# Patient Record
Sex: FEMALE | Race: BLACK OR AFRICAN AMERICAN | Employment: UNEMPLOYED | URBAN - METROPOLITAN AREA
[De-identification: names, ages, dates, MRNs, and addresses within clinical notes are randomized per-mention and may not be internally consistent; named-entity substitution may affect disease eponyms.]

---

## 2017-12-14 ENCOUNTER — HOSPITAL ENCOUNTER (OUTPATIENT)
Dept: MAMMOGRAPHY | Age: 57
Discharge: HOME | End: 2017-12-14
Attending: FAMILY MEDICINE
Payer: MEDICAID

## 2017-12-14 DIAGNOSIS — Z12.31 VISIT FOR SCREENING MAMMOGRAM: ICD-10-CM

## 2017-12-14 PROCEDURE — 77067 SCR MAMMO BI INCL CAD: CPT

## 2019-01-24 ENCOUNTER — HOSPITAL ENCOUNTER (OUTPATIENT)
Dept: MAMMOGRAPHY | Age: 59
Discharge: HOME | End: 2019-01-24
Attending: FAMILY MEDICINE
Payer: MEDICAID

## 2019-01-24 DIAGNOSIS — Z12.31 ENCOUNTER FOR SCREENING MAMMOGRAM FOR MALIGNANT NEOPLASM OF BREAST: ICD-10-CM

## 2019-01-24 PROCEDURE — 77067 SCR MAMMO BI INCL CAD: CPT

## 2020-01-28 ENCOUNTER — HOSPITAL ENCOUNTER (OUTPATIENT)
Dept: MAMMOGRAPHY | Age: 60
Discharge: HOME | End: 2020-01-28
Attending: FAMILY MEDICINE
Payer: MEDICAID

## 2020-01-28 DIAGNOSIS — Z12.31 VISIT FOR SCREENING MAMMOGRAM: ICD-10-CM

## 2020-01-28 PROCEDURE — 77067 SCR MAMMO BI INCL CAD: CPT

## 2020-12-02 ENCOUNTER — VIRTUAL VISIT (OUTPATIENT)
Dept: ENDOCRINOLOGY | Age: 60
End: 2020-12-02
Payer: MEDICAID

## 2020-12-02 DIAGNOSIS — E11.69 TYPE 2 DIABETES MELLITUS WITH OTHER SPECIFIED COMPLICATION, WITH LONG-TERM CURRENT USE OF INSULIN (HCC): Primary | ICD-10-CM

## 2020-12-02 DIAGNOSIS — E78.2 MIXED HYPERLIPIDEMIA: ICD-10-CM

## 2020-12-02 DIAGNOSIS — I10 ESSENTIAL HYPERTENSION: ICD-10-CM

## 2020-12-02 DIAGNOSIS — Z79.4 TYPE 2 DIABETES MELLITUS WITH OTHER SPECIFIED COMPLICATION, WITH LONG-TERM CURRENT USE OF INSULIN (HCC): Primary | ICD-10-CM

## 2020-12-02 PROCEDURE — 99205 OFFICE O/P NEW HI 60 MIN: CPT | Performed by: INTERNAL MEDICINE

## 2020-12-02 RX ORDER — INSULIN GLARGINE 100 [IU]/ML
INJECTION, SOLUTION SUBCUTANEOUS
COMMUNITY
Start: 2020-10-22 | End: 2021-01-21 | Stop reason: SDUPTHER

## 2020-12-02 RX ORDER — EXENATIDE 2 MG/.65ML
INJECTION, SUSPENSION, EXTENDED RELEASE SUBCUTANEOUS
COMMUNITY
Start: 2020-11-09 | End: 2021-04-30 | Stop reason: SDUPTHER

## 2020-12-02 RX ORDER — MONTELUKAST SODIUM 10 MG/1
TABLET ORAL
COMMUNITY
Start: 2020-09-22

## 2020-12-02 RX ORDER — TIOTROPIUM BROMIDE 18 UG/1
CAPSULE ORAL; RESPIRATORY (INHALATION)
COMMUNITY
Start: 2020-10-05

## 2020-12-02 RX ORDER — FLUTICASONE PROPIONATE 50 MCG
SPRAY, SUSPENSION (ML) NASAL
COMMUNITY
Start: 2020-09-22

## 2020-12-02 RX ORDER — ACETAMINOPHEN 500 MG
TABLET ORAL
COMMUNITY
Start: 2020-10-22

## 2020-12-02 RX ORDER — ATORVASTATIN CALCIUM 40 MG/1
TABLET, FILM COATED ORAL
COMMUNITY
Start: 2020-11-09

## 2020-12-02 RX ORDER — LOSARTAN POTASSIUM 50 MG/1
TABLET ORAL
COMMUNITY
Start: 2020-10-22

## 2020-12-02 RX ORDER — ASPIRIN 81 MG/1
TABLET ORAL
COMMUNITY
Start: 2020-09-30

## 2020-12-02 NOTE — PROGRESS NOTES
Chief Complaint   Patient presents with    Diabetes     pcp and pharmacy verified. DOXY. ME            **THIS IS A VIRTUAL VISIT VIA A VIDEO ENCOUNTER. PATIENT AGREED TO HAVE THEIR CARE DELIVERED OVER VIDEO IN PLACE OF THEIR REGULARLY SCHEDULED OFFICE VISIT**      History of Present Illness: Nieves Snellen is a 63 y.o. female who I was asked to see in consult by Dr. Conchita Landa for evaluation of diabetes. Will request records from Dr. Carlos Justice. She was first diagnosed with diabetes \"a long time ago I really don't know\". Current regimen is Metformin 500mg BID, Lantus 54 units daily and Bydureon 2mg weekly. She does not recall any other DM medications in the past.  She checks her blood sugar every morning, her FBGs run in the 130-150s range. She denies issues of hypoglycemia. A typical day is as follows:  Pt wakes around 7AM.  - She has breakfast around 8AM, this AM she had corned beef hash, bread and regular ginger ale. - She denies having a mid-morning snack.  - She has lunch around 1230PM, yesterday she had fried chicken wings, fries and water. - She denies having a mid-afternoon snack  - She has dinner around 6PM, last night she had vegetable soup and water. Exercise consists of house work. No history of vascular disease. She does have COPD. No history of neuropathy, or nephropathy. Last eye exam was \"awhile ago I have an appointment with Dr. Collette Highland in February 2021\". Pt reports she has never seen a diabetic educator and she notes she would be interested in these classes.     Past Medical History:   Diagnosis Date    COPD (chronic obstructive pulmonary disease) ([facility] [address]     Diabetes ([facility] [address]     Hypertension     Psychiatric disorder     anxiety     Past Surgical History:   Procedure Laterality Date    HX TUBAL LIGATION      [place] COLSC FLX W/REMOVAL LESION BY HOT BX FORCEPS  1/20/2014          Current Outpatient Medications   Medication Sig    aspirin delayed-release 81 mg tablet TAKE 1 TABLET BY MOUTH ONCE DAILY    atorvastatin (LIPITOR) 40 mg tablet TK 1 T PO QD    cholecalciferol (VITAMIN D3) (2,000 UNITS /50 MCG) cap capsule TK 2 CS PO ONCE A DAY    Bydureon 2 mg/0.65 mL pnij AS DIRECTED ONCE A WEEK    fluticasone propionate (FLONASE) 50 mcg/actuation nasal spray 2 SPRAYS IN EACH NOSTRIL ONCE D    Lantus Solostar U-100 Insulin 100 unit/mL (3 mL) inpn 54 units in AM    losartan (COZAAR) 50 mg tablet TAKE 1 TABLET BY MOUTH ONCE DAILY    montelukast (SINGULAIR) 10 mg tablet TK 1 T PO QHS    Spiriva with HandiHaler 18 mcg inhalation capsule 1 C BY INHALING THE CONTENTS OF THE C USING THE HANDIHALER DEVICE ONCE A DAY    metFORMIN (GLUCOPHAGE) 500 mg tablet Take  by mouth two (2) times daily (with meals).  risperiDONE (RISPERDAL) 1 mg tablet Take  by mouth every evening.  benztropine (COGENTIN) 1 mg tablet Take  by mouth every evening.  hydrochlorothiazide (HYDRODIURIL) 25 mg tablet Take 25 mg by mouth daily. No current facility-administered medications for this visit.       No Known Allergies  Family History   Problem Relation Age of Onset    Seizures Mother     Liver Disease Father     Lung Disease Sister     No Known Problems Brother     No Known Problems Maternal Grandmother     No Known Problems Maternal Grandfather     No Known Problems Paternal Grandmother     No Known Problems Paternal Grandfather     No Known Problems Brother      Social History     Socioeconomic History    Marital status: SINGLE     Spouse name: Not on file    Number of children: Not on file    Years of education: Not on file    Highest education level: Not on file   Occupational History    Not on file   Social Needs    Financial resource strain: Not on file    Food insecurity     Worry: Not on file     Inability: Not on file    Transportation needs     Medical: Not on file     Non-medical: Not on file   Tobacco Use    Smoking status: Former Smoker     Packs/day: 0.50    Smokeless tobacco: Never Used    Tobacco comment: Quit 6 mos ago 6/2020   Substance and Sexual Activity    Alcohol use: No    Drug use: No    Sexual activity: Not on file   Lifestyle    Physical activity     Days per week: Not on file     Minutes per session: Not on file    Stress: Not on file   Relationships    Social connections     Talks on phone: Not on file     Gets together: Not on file     Attends Denominational service: Not on file     Active member of club or organization: Not on file     Attends meetings of clubs or organizations: Not on file     Relationship status: Not on file    Intimate partner violence     Fear of current or ex partner: Not on file     Emotionally abused: Not on file     Physically abused: Not on file     Forced sexual activity: Not on file   Other Topics Concern    Not on file   Social History Narrative    Not on file     Review of Systems:  - Constitutional Symptoms: no fevers, chills, weight loss  - Eyes: no blurry vision or double vision  - Cardiovascular: no chest pain or palpitations  - Respiratory: no cough or shortness of breath  - Gastrointestinal: no dysphagia or abdominal pain  - Musculoskeletal: no joint pains or weakness  - Integumentary: no rashes  - Neurological: no numbness, tingling, or headaches  - Psychiatric: + depression and anxiety  - Endocrine: no heat or cold intolerance, no polyuria or polydipsia    Physical Examination:  There were no vitals taken for this visit.   - GENERAL: NCAT, Appears well nourished   - EYES: EOMI, non-icteric, no proptosis   - Ear/Nose/Throat: NCAT, no visible inflammation or masses   - CARDIOVASCULAR: no cyanosis, no visible JVD   - RESPIRATORY: respiratory effort normal without any distress or labored breathing   - MUSCULOSKELETAL: Normal ROM of neck and upper extremities observed   - SKIN: No rash on face   - NEUROLOGIC:  No facial asymmetry (Cranial nerve 7 motor function), No gaze palsy   - PSYCHIATRIC: Flat affect, Normal insight and judgement       Data Reviewed: Will request labs from PCP. Assessment/Plan:   1. Type 2 diabetes mellitus with other specified complication, with long-term current use of insulin ([address]    2. Essential hypertension    3. Mixed hyperlipidemia    1) Pt given copy of \"Daily Diabetes Meal Planning Guide\" and educated about the \"[place] dinner plate\", reading food labels and which foods have the highest carbohydrates. Pt instructed to limit her carbohydrates to 45-60 grams with meals and 15 grams or less with snacks (but to limit snacks). [facility] for further teaching about DM diet. Pt instructed to stop drinking sodas since they contain a lot of sugar. For now pt to continue the Lantus 54 units daily, Metformin 500mg BID and Bydureon 2mg weekly. Will order an A1C and we can discuss making changes to her regimen based on the results. 2) Pt is on an ARB for renal protection. Will not make any changes at this time, but will order a Urine MA    3) Pt to continue the Atorvastatin 40mg daily, will order a lipid panel and CMP. Pt voices understanding and agreement with the plan. RTC based on the above results    Patient Instructions   1) I have ordered labs and I want you to go to [facility] to have them drawn. The labs orders are included with this letter. 2) I want you to stop drinking sodas and coke, especially regular sodas because they contain a lot of sugar and will cause your blood sugars to run very high. Learning About Diabetes Food Guidelines  Your Care Instructions     Meal planning is important to manage diabetes. It helps keep your blood sugar at a target level (which you set with your doctor). You don't have to eat special foods. You can eat what your family eats, including sweets once in a while. But you do have to pay attention to how often you eat and how much you eat of certain foods.   You may want to work with a dietitian or a certified diabetes educator (CDE) to help you plan meals and snacks. A dietitian or CDE can also help you lose weight if that is one of your goals. What should you know about eating carbs? Managing the amount of carbohydrate (carbs) you eat is an important part of healthy meals when you have diabetes. Carbohydrate is found in many foods. · Learn which foods have carbs. And learn the amounts of carbs in different foods. ? Bread, cereal, pasta, and rice have about 15 grams of carbs in a serving. A serving is 1 slice of bread (1 ounce), ½ cup of cooked cereal, or 1/3 cup of cooked pasta or rice. ? Fruits have 15 grams of carbs in a serving. A serving is 1 small fresh fruit, such as an apple or orange; ½ of a banana; ½ cup of cooked or canned fruit; ½ cup of fruit juice; 1 cup of melon or raspberries; or 2 tablespoons of dried fruit. ? Milk and no-sugar-added yogurt have 15 grams of carbs in a serving. A serving is 1 cup of milk or 2/3 cup of no-sugar-added yogurt. ? Starchy vegetables have 15 grams of carbs in a serving. A serving is ½ cup of mashed potatoes or sweet potato; 1 cup winter squash; ½ of a small baked potato; ½ cup of cooked beans; or ½ cup cooked corn or green peas. · Learn how much carbs to eat each day and at each meal. A dietitian or CDE can teach you how to keep track of the amount of carbs you eat. This is called carbohydrate counting. · If you are not sure how to count carbohydrate grams, use the Plate Method to plan meals. It is a good, quick way to make sure that you have a balanced meal. It also helps you spread carbs throughout the day. ? Divide your plate by types of foods. Put non-starchy vegetables on half the plate, meat or other protein food on one-quarter of the plate, and a grain or starchy vegetable in the final quarter of the plate.  To this you can add a small piece of fruit and 1 cup of milk or yogurt, depending on how many carbs you are supposed to eat at a meal.  · Try to eat about the same amount of carbs at each meal. Do not \"save up\" your daily allowance of carbs to eat at one meal.  · Proteins have very little or no carbs per serving. Examples of proteins are beef, chicken, turkey, fish, eggs, tofu, cheese, cottage cheese, and peanut butter. A serving size of meat is 3 ounces, which is about the size of a deck of cards. Examples of meat substitute serving sizes (equal to 1 ounce of meat) are 1/4 cup of cottage cheese, 1 egg, 1 tablespoon of peanut butter, and ½ cup of tofu. How can you eat out and still eat healthy? · Learn to estimate the serving sizes of foods that have carbohydrate. If you measure food at home, it will be easier to estimate the amount in a serving of restaurant food. · If the meal you order has too much carbohydrate (such as potatoes, corn, or baked beans), ask to have a low-carbohydrate food instead. Ask for a salad or green vegetables. · If you use insulin, check your blood sugar before and after eating out to help you plan how much to eat in the future. · If you eat more carbohydrate at a meal than you had planned, take a walk or do other exercise. This will help lower your blood sugar. What else should you know? · Limit saturated fat, such as the fat from meat and dairy products. This is a healthy choice because people who have diabetes are at higher risk of heart disease. So choose lean cuts of meat and nonfat or low-fat dairy products. Use olive or canola oil instead of butter or shortening when cooking. · Don't skip meals. Your blood sugar may drop too low if you skip meals and take insulin or certain medicines for diabetes. · Check with your doctor before you drink alcohol. Alcohol can cause your blood sugar to drop too low. Alcohol can also cause a bad reaction if you take certain diabetes medicines. Follow-up care is a key part of your treatment and safety. Be sure to make and go to all appointments, and call your doctor if you are having problems. It's also a good idea to know your test results and keep a list of the medicines you take. Where can you learn more? Go to http://www.AdExtent.com/  Enter I147 in the search box to learn more about \"Learning About Diabetes Food Guidelines. \"  Current as of: December 20, 2019               Content Version: 12.6  © 7990-4949 [employer]. Care instructions adapted under license by [employer] (which disclaims liability or warranty for this information). If you have questions about a medical condition or this instruction, always ask your healthcare professional. [place] [address] any warranty or liability for your use of this information.             Copy sent to:  Dr. Calhoun No

## 2020-12-02 NOTE — PATIENT INSTRUCTIONS
1) [facility] The labs orders are included with this letter. 2) I want you to stop drinking sodas and coke, especially regular sodas because they contain a lot of sugar and will cause your blood sugars to run very high. Learning About Diabetes Food Guidelines  Your Care Instructions     Meal planning is important to manage diabetes. It helps keep your blood sugar at a target level (which you set with your doctor). You don't have to eat special foods. You can eat what your family eats, including sweets once in a while. But you do have to pay attention to how often you eat and how much you eat of certain foods. You may want to work with a dietitian or a certified diabetes educator (CDE) to help you plan meals and snacks. A dietitian or CDE can also help you lose weight if that is one of your goals. What should you know about eating carbs? Managing the amount of carbohydrate (carbs) you eat is an important part of healthy meals when you have diabetes. Carbohydrate is found in many foods. · Learn which foods have carbs. And learn the amounts of carbs in different foods. ? Bread, cereal, pasta, and rice have about 15 grams of carbs in a serving. A serving is 1 slice of bread (1 ounce), ½ cup of cooked cereal, or 1/3 cup of cooked pasta or rice. ? Fruits have 15 grams of carbs in a serving. A serving is 1 small fresh fruit, such as an apple or orange; ½ of a banana; ½ cup of cooked or canned fruit; ½ cup of fruit juice; 1 cup of melon or raspberries; or 2 tablespoons of dried fruit. ? Milk and no-sugar-added yogurt have 15 grams of carbs in a serving. A serving is 1 cup of milk or 2/3 cup of no-sugar-added yogurt. ? Starchy vegetables have 15 grams of carbs in a serving. A serving is ½ cup of mashed potatoes or sweet potato; 1 cup winter squash; ½ of a small baked potato; ½ cup of cooked beans; or ½ cup cooked corn or green peas.   · Learn how much carbs to eat each day and at each meal. A dietitian or CDE can teach you how to keep track of the amount of carbs you eat. This is called carbohydrate counting. · If you are not sure how to count carbohydrate grams, use the Plate Method to plan meals. It is a good, quick way to make sure that you have a balanced meal. It also helps you spread carbs throughout the day. ? Divide your plate by types of foods. Put non-starchy vegetables on half the plate, meat or other protein food on one-quarter of the plate, and a grain or starchy vegetable in the final quarter of the plate. To this you can add a small piece of fruit and 1 cup of milk or yogurt, depending on how many carbs you are supposed to eat at a meal.  · Try to eat about the same amount of carbs at each meal. Do not \"save up\" your daily allowance of carbs to eat at one meal.  · Proteins have very little or no carbs per serving. Examples of proteins are beef, chicken, turkey, fish, eggs, tofu, cheese, cottage cheese, and peanut butter. A serving size of meat is 3 ounces, which is about the size of a deck of cards. Examples of meat substitute serving sizes (equal to 1 ounce of meat) are 1/4 cup of cottage cheese, 1 egg, 1 tablespoon of peanut butter, and ½ cup of tofu. How can you eat out and still eat healthy? · Learn to estimate the serving sizes of foods that have carbohydrate. If you measure food at home, it will be easier to estimate the amount in a serving of restaurant food. · If the meal you order has too much carbohydrate (such as potatoes, corn, or baked beans), ask to have a low-carbohydrate food instead. Ask for a salad or green vegetables. · If you use insulin, check your blood sugar before and after eating out to help you plan how much to eat in the future. · If you eat more carbohydrate at a meal than you had planned, take a walk or do other exercise. This will help lower your blood sugar. What else should you know?   · Limit saturated fat, such as the fat from meat and dairy products. This is a healthy choice because people who have diabetes are at higher risk of heart disease. So choose lean cuts of meat and nonfat or low-fat dairy products. Use olive or canola oil instead of butter or shortening when cooking. · Don't skip meals. Your blood sugar may drop too low if you skip meals and take insulin or certain medicines for diabetes. · Check with your doctor before you drink alcohol. Alcohol can cause your blood sugar to drop too low. Alcohol can also cause a bad reaction if you take certain diabetes medicines. Follow-up care is a key part of your treatment and safety. Be sure to make and go to all appointments, and call your doctor if you are having problems. It's also a good idea to know your test results and keep a list of the medicines you take. Where can you learn more? Go to http://www.otero.com/  Enter I147 in the search box to learn more about \"Learning About Diabetes Food Guidelines. \"  Current as of: December 20, 2019               Content Version: 12.6  © 0612-9786 [employer]. Care instructions adapted under license by [employer] (which disclaims liability or warranty for this information). If you have questions about a medical condition or this instruction, always ask your healthcare professional. [address] any warranty or liability for your use of this information.

## 2021-01-05 ENCOUNTER — TRANSCRIBE ORDER (OUTPATIENT)
Dept: SCHEDULING | Age: 61
End: 2021-01-05

## 2021-01-05 DIAGNOSIS — Z12.31 SCREENING MAMMOGRAM FOR HIGH-RISK PATIENT: Primary | ICD-10-CM

## 2021-01-21 ENCOUNTER — TELEPHONE (OUTPATIENT)
Dept: ENDOCRINOLOGY | Age: 61
End: 2021-01-21

## 2021-01-21 RX ORDER — INSULIN GLARGINE 100 [IU]/ML
INJECTION, SOLUTION SUBCUTANEOUS
Qty: 30 ML | Refills: 5 | Status: SHIPPED | OUTPATIENT
Start: 2021-01-21

## 2021-01-21 RX ORDER — METFORMIN HYDROCHLORIDE 500 MG/1
TABLET ORAL
Qty: 120 TAB | Refills: 5 | Status: SHIPPED | OUTPATIENT
Start: 2021-01-21 | End: 2022-02-10

## 2021-01-21 NOTE — TELEPHONE ENCOUNTER
Received labs from PCP. Pt's A1C was 12.6%. I called pt and instructed her to increase her Metformin to 1000mg BID and her Lantus to 60 units daily. Pt again encouraged to stop drinking regular sodas and cut down on her carb intake. Pt voices understanding and agreement with the plan.

## 2021-03-18 ENCOUNTER — HOSPITAL ENCOUNTER (OUTPATIENT)
Dept: MAMMOGRAPHY | Age: 61
Discharge: HOME | End: 2021-03-18
Attending: NURSE PRACTITIONER
Payer: MEDICAID

## 2021-03-18 DIAGNOSIS — Z12.31 SCREENING MAMMOGRAM FOR HIGH-RISK PATIENT: ICD-10-CM

## 2021-03-18 PROCEDURE — 77067 SCR MAMMO BI INCL CAD: CPT

## 2021-04-02 ENCOUNTER — VIRTUAL VISIT (OUTPATIENT)
Dept: ENDOCRINOLOGY | Age: 61
End: 2021-04-02
Payer: MEDICAID

## 2021-04-02 DIAGNOSIS — I10 ESSENTIAL HYPERTENSION: ICD-10-CM

## 2021-04-02 DIAGNOSIS — E11.69 TYPE 2 DIABETES MELLITUS WITH OTHER SPECIFIED COMPLICATION, WITH LONG-TERM CURRENT USE OF INSULIN (HCC): Primary | ICD-10-CM

## 2021-04-02 DIAGNOSIS — Z79.4 TYPE 2 DIABETES MELLITUS WITH OTHER SPECIFIED COMPLICATION, WITH LONG-TERM CURRENT USE OF INSULIN (HCC): Primary | ICD-10-CM

## 2021-04-02 DIAGNOSIS — E78.2 MIXED HYPERLIPIDEMIA: ICD-10-CM

## 2021-04-02 PROCEDURE — 99443 PR PHYS/QHP TELEPHONE EVALUATION 21-30 MIN: CPT | Performed by: INTERNAL MEDICINE

## 2021-04-02 NOTE — PROGRESS NOTES
Chief Complaint   Patient presents with    Diabetes     pcp and pharmacy verified. Eye exam: due. DOXY. ME                     **THIS IS A VIRTUAL VISIT VIA A TELEPHONE ENCOUNTER. PATIENT AGREED TO HAVE THEIR CARE DELIVERED OVER THE PHONE IN PLACE OF THEIR REGULARLY SCHEDULED OFFICE VISIT**        History of Present Illness: Nirali Cornejo is a 63 y.o. female here for follow up of diabetes. She was first diagnosed with diabetes \"a long time ago I really don't know\". At our initial visit in December 2020 her regimen consisted of Metformin 500mg BID, Lantus 54 units daily and Bydureon 2mg weekly. She was eating a high carb diet and drinking regular sodas. Her A1C was 12.6% so I instructed her to increase her Metformin to 1000mg BID and her Lantus to 60 units daily. Pt instructed to work on lowering her carb intake and stopping the regular sodas. Pt denies any recent illnesses, injuries or hospitalizations. Pt reports she has been taking the Lantus 60 units daily, Bydureon 2mg weekly and Metformin 1000mg BID. Pt reports that she I testing her BGs every morning. Her FBGs have been in the 130-200s  She denies issues of hypoglycemia. A typical day is as follows:  Pt wakes around 7AM.  - She has breakfast around 8AM, this AM she had a bologna sandwich and water. - She denies having a mid-morning snack.  - She has lunch around 1230PM, yesterday she had a bologna sandwich, no side items and orange soda (regular). - She denies having a mid-afternoon snack  - She has dinner around 5-6PM, last night she had a shrimp, fries and water. - She will occasionally have an evening snack. Exercise consists of house work. No history of vascular disease. She does have COPD. No history of neuropathy, or nephropathy. Last eye exam was \"awhile ago, I have to find a new eye doctor, because of my insurance. \"     Pt reports she has never seen a diabetic educator and she notes she would be interested in these classes. Current Outpatient Medications   Medication Sig    Lantus Solostar U-100 Insulin 100 unit/mL (3 mL) inpn 60 units every day (note dose increase) (Patient taking differently: 60 units every day (note dose increase) In AM)    metFORMIN (GLUCOPHAGE) 500 mg tablet 2 pills in the morning and 2 pills with dinner (note dose increase)    aspirin delayed-release 81 mg tablet TAKE 1 TABLET BY MOUTH ONCE DAILY    atorvastatin (LIPITOR) 40 mg tablet TK 1 T PO QD    cholecalciferol (VITAMIN D3) (2,000 UNITS /50 MCG) cap capsule TK 2 CS PO ONCE A DAY    Bydureon 2 mg/0.65 mL pnij AS DIRECTED ONCE A WEEK    fluticasone propionate (FLONASE) 50 mcg/actuation nasal spray 2 SPRAYS IN EACH NOSTRIL ONCE D    losartan (COZAAR) 50 mg tablet TAKE 1 TABLET BY MOUTH ONCE DAILY    montelukast (SINGULAIR) 10 mg tablet TK 1 T PO QHS    Spiriva with HandiHaler 18 mcg inhalation capsule 1 C BY INHALING THE CONTENTS OF THE C USING THE HANDIHALER DEVICE ONCE A DAY    risperiDONE (RISPERDAL) 1 mg tablet Take  by mouth every evening.  benztropine (COGENTIN) 1 mg tablet Take  by mouth every evening.  hydrochlorothiazide (HYDRODIURIL) 25 mg tablet Take 25 mg by mouth daily. No current facility-administered medications for this visit. No Known Allergies  Review of Systems:  - Eyes: no blurry vision or double vision  - Cardiovascular: no chest pain  - Respiratory: no shortness of breath  - Musculoskeletal: no myalgias  - Neurological: no numbness/tingling in extremities    Physical Examination:  There were no vitals taken for this visit. None    Data Reviewed:   - none new for review    Assessment/Plan:   1) DM > Pt reported that she is still drinking 3 regular sodas per day. Pt again instructed to stop the regular sodas. Pt instructed to limit her carbohydrates to 30-45 grams with meals and 15 grams or less with snacks (but to limit snacks).   Will refer [facility] to [facility] for further teaching about DM diet. Pt instructed to stop drinking sodas since they contain a lot of sugar. For now pt to continue the Lantus 60 units daily, Metformin 1000mg BID and Bydureon 2mg weekly. Will order an A1C and we can discuss making changes to her regimen based on the results. 2) HTN > Pt is on an ARB for renal protection. Will not make any changes at this time, but will order a Urine MA    3) HLD > Pt to continue the Atorvastatin 40mg daily, will order a lipid panel and CMP. Pt voices understanding and agreement with the plan. RTC based on the above results    I spent 27 minutes of total time during this virtual visit with a telephone encounter. All questions were answered and a copy of the instructions were sent to her via [employer]/a letter.        Copy sent to:    Dr. Lela Lindsey

## 2021-04-22 ENCOUNTER — TELEPHONE (OUTPATIENT)
Dept: ENDOCRINOLOGY | Age: 61
End: 2021-04-22

## 2021-04-30 RX ORDER — EXENATIDE 2 MG/.65ML
INJECTION, SUSPENSION, EXTENDED RELEASE SUBCUTANEOUS
Qty: 4 PEN | Refills: 4 | Status: SHIPPED | OUTPATIENT
Start: 2021-04-30 | End: 2021-04-30 | Stop reason: RX

## 2021-04-30 RX ORDER — EXENATIDE 2 MG/.85ML
INJECTION, SUSPENSION, EXTENDED RELEASE SUBCUTANEOUS
Qty: 4 SYRINGE | Refills: 3 | Status: SHIPPED | OUTPATIENT
Start: 2021-04-30 | End: 2021-05-28 | Stop reason: CLARIF

## 2021-04-30 NOTE — TELEPHONE ENCOUNTER
----- Message from Luzmaria Page sent at 4/29/2021  4:33 PM EDT -----  Regarding: /Telephone  Caller's first and last name and relationship (if not the patient): Lulú Villa (daughter)     Best contact number(s): [phone]    Whose call is being returned:Daughter     Details to clarify the request: Weekly shot needs to be sent over to the [address] in [address] [place] [address]. Original pharmacy discontinued the PT's weekly shot. PT needs to take this shot by tomorrow.

## 2021-04-30 NOTE — TELEPHONE ENCOUNTER
Per last OV: For now pt to continue the Lantus 60 units daily, Metformin 1000mg BID and Bydureon 2mg weekly No

## 2021-05-07 ENCOUNTER — VIRTUAL VISIT (OUTPATIENT)
Dept: DIABETES SERVICES | Age: 61
End: 2021-05-07
Payer: MEDICAID

## 2021-05-07 DIAGNOSIS — Z79.4 TYPE 2 DIABETES MELLITUS WITH OTHER SPECIFIED COMPLICATION, WITH LONG-TERM CURRENT USE OF INSULIN (HCC): Primary | ICD-10-CM

## 2021-05-07 DIAGNOSIS — E11.69 TYPE 2 DIABETES MELLITUS WITH OTHER SPECIFIED COMPLICATION, WITH LONG-TERM CURRENT USE OF INSULIN (HCC): Primary | ICD-10-CM

## 2021-05-07 PROCEDURE — G0108 DIAB MANAGE TRN  PER INDIV: HCPCS | Performed by: DIETITIAN, REGISTERED

## 2021-05-07 NOTE — PROGRESS NOTES
[facility]  Diabetes Self-Management Education & Support Program  Pre-program  Assessment    Reason for Referral: diabetes education  Referral Source: Celia Lindsay MD  Services requested: DSMES    ASSESSMENT    From my perspective, the participant would benefit from [facility] specifically related to Reducing risks, Healthy eating, Monitoring, Physical activity, Taking medications, Healthy coping and Problem solving. Will adapt DSMES program to build on participant's strengths in skills score, strengths in confidence score and strengths in preparedness score as noted in the Diabetes Skills, Confidence, and Preparedness Index. During the program, we will focus on providing DSMES that specifically addresses participant's interest in Healthy eating, as shown by their reported readiness to change. The participant would be best served by attending weekly individual sessions as Ms. Darlene Phipps does not have transportation. Diabetes Self-Management Education Follow-up Visit: 1 week       Clinical Presentation  Kita Dixon is a 63 y.o.  female referred for diabetes self-management education. Participant has Type 2 DM on insulin for unknown amount of time. Family history negative for diabetes.  Patient reports not receiving DSMES services in the past. Most recent A1c value:   Lab Results   Component Value Date/Time    Hemoglobin A1c, External 12.6 01/08/2021       Diabetes-related medical history:none per patient    Diabetes-related medications:  Current dosing:   Key Antihyperglycemic Medications              no longer in stock at pharmacy Lantus Solostar U-100 Insulin 100 unit/mL (3 mL) inpn 60 units every day in the morning (note dose increase)    metFORMIN (GLUCOPHAGE) 500 mg tablet 2 pills in the morning and 2 pills with dinner (note dose increase)          Blood Pressure Management  Key ACE/ARB Medications             losartan (COZAAR) 50 mg tablet TAKE 1 TABLET BY MOUTH ONCE DAILY          Lipid Management  Key Antihyperlipidemia Meds             atorvastatin (LIPITOR) 40 mg tablet TK 1 T PO QD          Clot Prevention  Key Anti-Platelet Anticoagulant Meds             aspirin delayed-release 81 mg tablet TAKE 1 TABLET BY MOUTH ONCE DAILY          Learning Assessment  Learning objectives Educator assessment (5/7/2021)   Diabetes Disease Process  The participant can   A) describe diabetes in basic terms;   B) state the type of diabetes they have; &   C) state accepted blood glucose targets. Healthy Eating  The participant can   A) identify carbohydrate foods; &   B) accurately read food labels. Being Active  The participant can  A) state the benefits of physical activity;  B) report their current PA practices;  C) identify PA they would consider incorporating in their lives; &  D) develop an implementation plan. Monitoring  The participant can  A) operate their blood glucose meter; &  B) describe how they log their blood glucoses to share with their provider. Taking Medications  The participant can  A) name their diabetes medications;  B) state the purpose and dose;  C) note side effects; &  D) describe proper storage, disposal & transport (if appropriate). Healthy Coping  The participant can    A) describe their response to diabetes diagnosis; B) describe their specific coping mechanisms;  C) identify supportive people and/or other resources that positively support their diabetes self-care and health. Reducing Risks  The participant can describe the preventive measures used by providers to promote health and prevent diabetes complications. Problem Solving  The participant can   A) identify signs, symptoms & treatment of hypoglycemia;   B) identify signs, symptoms & treatment of hyperglycemia;  C) describe their sick day plan; &  D) identify BG patterns to discuss with their provider. No  No  No        No  Yes        No  Yes  Yes  Yes        Yes  No        Yes  Yes  Yes  Yes        Yes  No  Yes        No          No  No  No  No     Characteristics to Learning   Barriers to Learning   [] Cognitive loss  [] Mental retardation   [] Intellectual delay/cognitive impairment  [] Psychiatric disorder  [] Visually impaired  [] Hearing loss                 [] Low literacy  [] Low health literacy  [] Language  [] Functional limitation  [] Pain   [] Financial  [x] Transportation  [] None   Favorite Ways to Learn   [x] Lecture  [] Slides  [] Reading [] Video-Internet  [] Cassettes/CDs/MP3's  [] Interactive Small Groups [] Other       Behavioral Assessment  Current self-care practices  Educator assessment (5/7/2021)   Healthy Eating  Current practices  24-hour Dietary Recall:  Breakfast: boiled egg, 1 toast plain, water  Lunch: grilled cheese sandwich, salad with ranch dressing, water  Dinner: baked chicken and stuffing, water  Snacks: none  Beverages: water  Alcohol: none     Would benefit from DSMES related to Healthy Eating: Yes      Eats a carbohydrate controlled diet: No      Stage of change: Preparation   Being Active  Current practices  How many days during the past week have you performed physical activity where your heart beats faster and your breathing is harder than normal for 30 minutes or more?  0 days    How many days in a typical week do you perform activity such as this?  0 days     Would benefit from [facility] related to Being Active: Yes      Exercises 150 minutes/week: No      Stage of change: Contemplation    Currently is walking 2 times a week   Monitoring  Current practices  Do you monitor your blood sugar? Yes    How often do you monitor? 2x/day    What are the range of readings? Fasting 156 mg/dL      Do you know your last A1c measurement? No    Do you know the meaning of the A1c?  No     Would benefit from [facility] related to Monitoring: Yes      Uses BG readings to establish trends and understand BG patterns: No      Stage of change: contemplation   Taking Medication  Current practices  Do you understand what your diabetes medications do? Yes    How often do you miss doses of your diabetes medications? Never    Can you afford your diabetes medications? yes   Would benefit from [facility] related to Taking Medication: Yes      Takes medications consistently to receive full benefit: Yes      Stage of change: Contemplation       Healthy Coping   Current state  Diabetes Skills, Confidence and Preparedness Index: Total score: 6.0  Skills: 6.0  Confidence: 6.0  Preparedness: 6.0   Would benefit from DSMES related to Healthy Coping: Yes      Identifies specific people, organizations,etc, that actively support their diabetes self-care efforts: Yes      Stage of change: Contemplation     Reducing Risks  Current state  Vaccines:  Influenza: There is no immunization history on file for this patient. Pneumococcal:   There is no immunization history on file for this patient. Hepatitis:   There is no immunization history on file for this patient. Examinations:  Diabetic Foot and Eye Exam HM Status   Topic Date Due    Diabetic Foot Care  4/21    Eye Exam  unknown        Dental exam: Last appointment was: 1 year ago     Heart Protection:  BP Readings from Last 2 Encounters:   01/20/14 139/74        No results found for: LDL, LDLC, DLDLP     Kidney Protection:  No results found for: MCACR, MCA1, MCA2, MCA3, MCAU, MCAU2, MCALPOCT     Would benefit from [facility] related to Reducing Risks: Yes      Actively participates in decision-making with provider regarding secondary prevention:  Yes      Stage of change: Action   Problem Solving  Current state  Hypoglycemia Management:  What are signs and symptoms of hypoglycemia that you experience? Dizziness/light-headedness    How do you prevent hypoglycemia? Consistent meals/snack times    How do you treat hypoglycemia?  Pt reported being unaware of how to treat hypoglycemia    Hyperglycemia Management:  What are signs and symptoms of hyperglycemia that you experience? Pt reported being unaware of s/s of hyperglycemia    How can you prevent hyperglycemia? Pt reported being unaware of how to prevent hyperglycemia    Sick Day Management:  What do you do differently on sick days? Pt reported being unaware of self-management on sick days    Pattern Management:  Do you notice blood glucose patterns when you look at the readings in your meter or logbook? No    How do you use the blood glucose readings from your meter or logbook? Pt reported being unaware of pattern management skills     Would benefit from [facility] related to Problem Solving: Yes      Articulates appropriate strategies to address hypoglycemia, hyperglycemia, sick day care and BG pattern: No      Stage of change: Preparation     Note: Content derived from the American Association of Diabetes Educators' Diabetes Education Curriculum: A Guide to Successful Self-Management (2nd edition)      Aide Jenkins RD on 5/7/2021 at 8:25 AM    [facility] [employer] for Telehealth: Julio Stevenson is a 63 y.o. female being evaluated through a synchronous (real-time) audio only technology platform, as a substitution for an in-person encounter, to address the healthcare issues mentioned above. I was in the office. The patient was at home. A caregiver was present when appropriate. The patient and/or her healthcare decision maker, is aware that this patient-initiated, Telehealth encounter on 5/7/2021 is a billable service, with coverage as determined by her insurance carrier. She is aware that she may receive a bill and has provided verbal consent to proceed: Yes. This telehealth encounter occurred during the COVID-19 pandemic and public health emergency. Evaluation of the following organ systems was limited: Vitals/Constitutional/EENT/Resp/CV/GI//MS/Neuro/Skin/Heme-Lymph-Imm.   Pursuant to the emergency declaration under the [address] [facility], [address] [place] [address] authority and the [employer] and [employer] Act, this Virtual Visit was conducted with patient's (and/or legal guardian's) consent, to reduce the risk of exposure to COVID-19 and provide necessary medical care. Time in appointment: 30 minutes        [facility] Logo  Diabetes Skills, Confidence & Preparedness Index (SCPI) ©  Thank you for completing the Skills, Confidence & Preparedness Index! Below are your scores. All scales and questions are out of 7. If you would like these results emailed, please enter your email address along with some identifying patient information. Email:    Patient Identifier:        Overall SCPI score: 6.0 Skills Score: 6.0  Low: Healthy Eating(Q1),Taking Medication(Q2),Blood Sugar Monitoring(Q3),Blood Sugar Monitoring(Q4),Reducing Risks(Q5),Problem Solving(Q6),Healthy Coping(Q7),Blood Sugar Monitoring(Q8),Reducing Risks(Q9) Confidence Score: 6.0  Low: Healthy Eating(Q1),Healthy Coping(Q2),Reducing Risks(Q3),Healthy Eating(Q4),Being Active(Q5),Blood Sugar Monitoring(Q6),Problem YTRAMEI(H9) Preparedness Score: 6.0  Low: Healthy Eating(Q1),Being Active(Q2),Healthy Coping(Q3),Reducing Risks(Q4),Taking Medication(Q5),Blood Sugar Monitoring(Q6),Problem Solving(Q7)  Healthy Eating Score: 6.0  Low: Skills(Q1),Confidence(Q1),Confidence(Q4),Preparedness(Q1) Taking Medication Score: 6.0  Low: Skills(Q2),Preparedness(Q5) Blood Sugar Monitoring Score: 6.0  Low: Skills(Q3),Skills(Q4),Skills(Q8),Confidence(Q6),Preparedness(Q6) Reducing Risks Score: 6.0  Low: Skills(Q5),Skills(Q9),Confidence(Q3),Preparedness(Q4)  Problem Solving Score: 6.0  Low: Skills(Q6),Confidence(Q7),Preparedness(Q7) Healthy Coping Score: 6.0  Low: Skills(Q7),Confidence(Q2),Preparedness(Q3) Being Active Score: 6.0  Low: Confidence(Q5),Preparedness(Q2)    Skills/Knowledge Questions  1.  I know how to plan meals that have the best balance between carbohydrates, proteins and vegetables. 6  2. I know how my diabetes medications (pills, injectables and/or insulin) work in my body. 6  3. I know when to check my blood sugar if I want to see how my body responded to a meal. 6  4. I know when to check my blood sugars to determine if my medication or insulin doses are correct. 6  5. I know what to do to prevent a low blood sugar when I exercise (either before, during, or after). 6  6. When I am sick, I know what to do differently with my diabetes management. 6  7. I know how stress can affect my diabetes management. 6  8. When I look at my blood sugars over a given week, I can explain what my blood sugar pattern is. 6  9. I know what my target levels are for A1c, blood pressure and cholesterol. 6  Confidence Questions  1. I am confident that I can plan balanced meals and snacks. 6  2. I am confident that I can manage my stress. 6  3. I am confident that I can prevent a low blood sugar during or after exercise. 6  4. I am confident that the next time I eat out, I will be able to choose foods that best keep my blood sugars in target. 6  5. I am confident I can include exercise into my schedule. 6  6. I am confident that I can use my daily blood sugars to adjust my diet, my activity, and/or my insulin. 6  7. When something out of my normal routine happens, I am confident that I can problem-solve and keep my diabetes on track. 6  Preparedness Questions  1. Within the next month, I will begin to eat more balanced meals and snacks. 6  2. Within the next month, I will choose an exercise activity and I will start fitting it into my schedule. 6  3. Within the next month, I will make a list of stress management options that work for me. 6  4. Within the next month, I will consistently plan ahead to prevent low blood sugars. 6  5. Within the next month, I will start adjusting my insulin doses on my own. 6  6.  Within the next month, I will begin making changes to my diabetes management based on my daily blood sugars (eg - eating, activity and/or insulin). 6  7. Within the next month, I will begin making changes to my diabetes management to meet my overall goals (eg - eating, activity and/or insulin).  6

## 2021-05-28 ENCOUNTER — TELEPHONE (OUTPATIENT)
Dept: ENDOCRINOLOGY | Age: 61
End: 2021-05-28

## 2021-05-28 NOTE — TELEPHONE ENCOUNTER
Called pt and let her know that her insurance is refusing to cover her Bydureon any longer and is requiring she take Victoza as an alternative. I explained that the Victoza is a GLP-1 agent like the Bydureon, but is once per day. Will start her on 1.2mg every day since she is not GLP-1 naieve. Pt voices understanding and agreement with the plan.

## 2021-06-03 NOTE — TELEPHONE ENCOUNTER
Pt called states she wants to her Rx Victoza to go to [facility], instead of [facility].

## 2021-10-08 LAB
CREATININE, EXTERNAL: 0.91
HBA1C MFR BLD HPLC: 14.8 %
LDL-C, EXTERNAL: 64
MICROALBUMIN UR TEST STR-MCNC: 152.6 MG/DL

## 2021-11-01 NOTE — TELEPHONE ENCOUNTER
----- Message from Erasmo Alba sent at 11/1/2021  5:03 PM EDT -----  Regarding: Dr. Wei Clifton: [phone]  Pt requesting refill of Rx Trulicity      [facility])

## 2021-11-02 NOTE — TELEPHONE ENCOUNTER
----- Message from Ashanti Nunez sent at 11/2/2021  9:13 AM EDT -----  Regarding: Md Howell/Telephone  Patient return call    Caller's first and last name and relationship (if not the patient): n/a       Best contact number(s):[phone]        Whose call is being returned: n/a       Details to clarify the request: n/a       Ashanti Nunez

## 2022-02-10 ENCOUNTER — VIRTUAL VISIT (OUTPATIENT)
Dept: ENDOCRINOLOGY | Age: 62
End: 2022-02-10
Payer: MEDICAID

## 2022-02-10 DIAGNOSIS — E78.2 MIXED HYPERLIPIDEMIA: ICD-10-CM

## 2022-02-10 DIAGNOSIS — Z79.4 TYPE 2 DIABETES MELLITUS WITH OTHER SPECIFIED COMPLICATION, WITH LONG-TERM CURRENT USE OF INSULIN (HCC): Primary | ICD-10-CM

## 2022-02-10 DIAGNOSIS — E11.69 TYPE 2 DIABETES MELLITUS WITH OTHER SPECIFIED COMPLICATION, WITH LONG-TERM CURRENT USE OF INSULIN (HCC): Primary | ICD-10-CM

## 2022-02-10 DIAGNOSIS — I10 ESSENTIAL HYPERTENSION: ICD-10-CM

## 2022-02-10 PROCEDURE — 99442 PR PHYS/QHP TELEPHONE EVALUATION 11-20 MIN: CPT | Performed by: INTERNAL MEDICINE

## 2022-02-10 RX ORDER — METFORMIN HYDROCHLORIDE 1000 MG/1
1000 TABLET ORAL 2 TIMES DAILY WITH MEALS
COMMUNITY
Start: 2022-02-02

## 2022-02-10 RX ORDER — DULAGLUTIDE 1.5 MG/.5ML
INJECTION, SOLUTION SUBCUTANEOUS
COMMUNITY
Start: 2021-12-17

## 2022-02-10 NOTE — PROGRESS NOTES
Chief Complaint   Patient presents with    Diabetes     pcp and pharmacy verified  Telephone only   [phone] (H)            **THIS IS A VIRTUAL VISIT VIA A TELEPHONE ENCOUNTER. PATIENT AGREED TO HAVE THEIR CARE DELIVERED OVER THE PHONE IN PLACE OF THEIR REGULARLY SCHEDULED OFFICE VISIT**       Maribel De Anda, was evaluated through a synchronous (real-time) audio-video encounter. The patient (or guardian if applicable) is aware that this is a billable service, which includes applicable co-pays. This Virtual Visit was conducted with patient's (and/or legal guardian's) consent. The visit was conducted pursuant to the emergency declaration under the [address], [facility] authority and the [employer] and [employer] Act. Patient identification was verified, and a caregiver was present when appropriate. The patient was located in a state where the provider was licensed to provide care. History of Present Illness: Maribel De Anda is a 57 y.o. female here for follow up of diabetes. She had an A1C in October 2021 14.8%. Pt denies any recent illnesses, injuries or hospitalizations. Pt has received both COVID vaccinations (Pam Maeve). Pt reports she has been taking the Lantus 60 units daily, Trulicity 7.1UG weekly and Metformin Metformin 1000mg BID. Pt reports that she I testing her BGs every morning. Her FBGs have been in the 110's. She has not had any BGs under 100. A typical day is as follows:  Pt wakes around 7AM.  - She has breakfast around 8AM, this AM she had a ham, egg and cheese sandwich and regular soda. - She denies having a mid-morning snack.  - She has lunch around 1PM, yesterday she had chicken, potato wedges and water. - She denies having a mid-afternoon snack  - She has dinner around 5-6PM, last night she had an egg sandwich and water. .   - She will occasionally have an evening snack.     She is still drinking the 3 regular sodas per day. Exercise consists of house work. No history of vascular disease. She does have COPD. No history of neuropathy, or nephropathy. Last eye exam was \"awhile ago, I have to find a new eye doctor, because of my insurance. \"     Current Outpatient Medications   Medication Sig    Trulicity 1.5 RQ/1.9 mL sub-q pen 1.5 MG SUBCUTANEOUS INJECTION ONCE A WEEK    metFORMIN (GLUCOPHAGE) 1,000 mg tablet Take 1,000 mg by mouth two (2) times daily (with meals).  Lantus Solostar U-100 Insulin 100 unit/mL (3 mL) inpn 60 units every day (note dose increase) (Patient taking differently: 62 units every day In AM)    aspirin delayed-release 81 mg tablet TAKE 1 TABLET BY MOUTH ONCE DAILY    atorvastatin (LIPITOR) 40 mg tablet TK 1 T PO QD    cholecalciferol (VITAMIN D3) (2,000 UNITS /50 MCG) cap capsule TK 2 CS PO ONCE A DAY    fluticasone propionate (FLONASE) 50 mcg/actuation nasal spray 2 SPRAYS IN EACH NOSTRIL ONCE D    losartan (COZAAR) 50 mg tablet TAKE 1 TABLET BY MOUTH ONCE DAILY    montelukast (SINGULAIR) 10 mg tablet TK 1 T PO QHS    Spiriva with HandiHaler 18 mcg inhalation capsule 1 C BY INHALING THE CONTENTS OF THE C USING THE HANDIHALER DEVICE ONCE A DAY    risperiDONE (RISPERDAL) 1 mg tablet Take  by mouth every evening.  benztropine (COGENTIN) 1 mg tablet Take  by mouth every evening.  hydrochlorothiazide (HYDRODIURIL) 25 mg tablet Take 25 mg by mouth daily. No current facility-administered medications for this visit. No Known Allergies  Review of Systems:  - Eyes: no blurry vision or double vision  - Cardiovascular: no chest pain  - Respiratory: no shortness of breath  - Musculoskeletal: no myalgias  - Neurological: no numbness/tingling in extremities    Physical Examination:  There were no vitals taken for this visit.   None    Data Reviewed:   - none new for review    Assessment/Plan:   1) DM > Pt refuses to stop drinking her regular sodas despite repeated instructions because of the effects of the sodas on her sugars. Pt again instructed to stop the regular sodas. Pt instructed to limit her carbohydrates to 30-45 grams with meals and 15 grams or less with snacks (but to limit snacks). For now pt to continue the Lantus 60 units daily, Metformin 8834WJ BID and Trulicity 9.4JP weekly. Will order an A1C and we can discuss making changes to her regimen based on the results. 2) HTN > Pt is on an ARB for renal protection. Will not make any changes at this time, but will order a Urine MA    3) HLD > Pt to continue the Atorvastatin 40mg daily, will order a lipid panel and CMP. Pt voices understanding and agreement with the plan. RTC based on the above results    I spent 12 minutes of total time during this virtual visit with a telephone encounter. All questions were answered and a copy of the instructions were sent to her via [employer]/a letter.        Copy sent to:    Luis Silverio

## 2022-02-10 NOTE — LETTER
2/10/2022 11:53 AM    Patient:  Spring Mallory   YOB: 1960  Date of Visit: 2/10/2022      Dear Sera Landa NP  [facility] [phone]  Via Fax: [phone]: Thank you for referring Ms. Harshil Hardy to me for evaluation/treatment. Below are the relevant portions of my assessment and plan of care. If you have questions, please do not hesitate to call me. I look forward to following Ms. Charly Zambrano along with you. Chief Complaint   Patient presents with    Diabetes     pcp and pharmacy verified  Telephone only   [phone] (H)            **THIS IS A VIRTUAL VISIT VIA A TELEPHONE ENCOUNTER. PATIENT AGREED TO HAVE THEIR CARE DELIVERED OVER THE PHONE IN PLACE OF THEIR REGULARLY SCHEDULED OFFICE VISIT**       Spring Mallory, was evaluated through a synchronous (real-time) audio-video encounter. The patient (or guardian if applicable) is aware that this is a billable service, which includes applicable co-pays. This Virtual Visit was conducted with patient's (and/or legal guardian's) consent. The visit was conducted pursuant to the emergency declaration under the [address] [place] [address], [address] [place] [address] authority and the [employer] General Act. Patient identification was verified, and a caregiver was present when appropriate. The patient was located in a state where the provider was licensed to provide care. History of Present Illness: Spring Mallory is a 57 y.o. female here for follow up of diabetes. She had an A1C in October 2021 14.8%. Pt denies any recent illnesses, injuries or hospitalizations. Pt has received both COVID vaccinations ([facility], [facility]). Pt reports she has been taking the Lantus 60 units daily, Trulicity 0.3HK weekly and Metformin Metformin 1000mg BID. Pt reports that she I testing her BGs every morning. Her FBGs have been in the 110's.   She has not had any BGs under 100. A typical day is as follows:  Pt wakes around 7AM.  - She has breakfast around 8AM, this AM she had a ham, egg and cheese sandwich and regular soda. - She denies having a mid-morning snack.  - She has lunch around 1PM, yesterday she had chicken, potato wedges and water. - She denies having a mid-afternoon snack  - She has dinner around 5-6PM, last night she had an egg sandwich and water. .   - She will occasionally have an evening snack. She is still drinking the 3 regular sodas per day. Exercise consists of house work. No history of vascular disease. She does have COPD. No history of neuropathy, or nephropathy. Last eye exam was \"awhile ago, I have to find a new eye doctor, because of my insurance. \"     Current Outpatient Medications   Medication Sig    Trulicity 1.5 UU/5.5 mL sub-q pen 1.5 MG SUBCUTANEOUS INJECTION ONCE A WEEK    metFORMIN (GLUCOPHAGE) 1,000 mg tablet Take 1,000 mg by mouth two (2) times daily (with meals).  Lantus Solostar U-100 Insulin 100 unit/mL (3 mL) inpn 60 units every day (note dose increase) (Patient taking differently: 62 units every day In AM)    aspirin delayed-release 81 mg tablet TAKE 1 TABLET BY MOUTH ONCE DAILY    atorvastatin (LIPITOR) 40 mg tablet TK 1 T PO QD    cholecalciferol (VITAMIN D3) (2,000 UNITS /50 MCG) cap capsule TK 2 CS PO ONCE A DAY    fluticasone propionate (FLONASE) 50 mcg/actuation nasal spray 2 SPRAYS IN EACH NOSTRIL ONCE D    losartan (COZAAR) 50 mg tablet TAKE 1 TABLET BY MOUTH ONCE DAILY    montelukast (SINGULAIR) 10 mg tablet TK 1 T PO QHS    Spiriva with HandiHaler 18 mcg inhalation capsule 1 C BY INHALING THE CONTENTS OF THE C USING THE HANDIHALER DEVICE ONCE A DAY    risperiDONE (RISPERDAL) 1 mg tablet Take  by mouth every evening.  benztropine (COGENTIN) 1 mg tablet Take  by mouth every evening.  hydrochlorothiazide (HYDRODIURIL) 25 mg tablet Take 25 mg by mouth daily.      No current facility-administered medications for this visit. No Known Allergies  Review of Systems:  - Eyes: no blurry vision or double vision  - Cardiovascular: no chest pain  - Respiratory: no shortness of breath  - Musculoskeletal: no myalgias  - Neurological: no numbness/tingling in extremities    Physical Examination:  There were no vitals taken for this visit. None    Data Reviewed:   - none new for review    Assessment/Plan:   1) DM > Pt refuses to stop drinking her regular sodas despite repeated instructions because of the effects of the sodas on her sugars. Pt again instructed to stop the regular sodas. Pt instructed to limit her carbohydrates to 30-45 grams with meals and 15 grams or less with snacks (but to limit snacks). For now pt to continue the Lantus 60 units daily, Metformin 1928YQ BID and Trulicity 9.8AD weekly. Will order an A1C and we can discuss making changes to her regimen based on the results. 2) HTN > Pt is on an ARB for renal protection. Will not make any changes at this time, but will order a Urine MA    3) HLD > Pt to continue the Atorvastatin 40mg daily, will order a lipid panel and CMP. Pt voices understanding and agreement with the plan. RTC based on the above results    I spent 12 minutes of total time during this virtual visit with a telephone encounter. All questions were answered and a copy of the instructions were sent to her via [employer]/a letter.        Copy sent to:    Humaira Florian           Sincerely,      Christina Manley MD

## 2022-02-15 ENCOUNTER — TELEPHONE (OUTPATIENT)
Dept: ENDOCRINOLOGY | Age: 62
End: 2022-02-15

## 2022-02-15 NOTE — TELEPHONE ENCOUNTER
Pt called said she don't have way to go to right now. She said she have an appointment with her PCP next month, is it ok to have it done there and have her PCP fax over her results?

## 2022-02-16 NOTE — TELEPHONE ENCOUNTER
Advised patient OK to go to PCP to have labs drawn. Faxed orders to [facility] office. Chief Complaint   Patient presents with     Establish Care     MP/MA

## 2022-02-24 ENCOUNTER — TRANSCRIBE ORDER (OUTPATIENT)
Dept: SCHEDULING | Age: 62
End: 2022-02-24

## 2022-02-24 DIAGNOSIS — Z12.31 VISIT FOR SCREENING MAMMOGRAM: Primary | ICD-10-CM

## 2022-03-29 LAB
CREATININE, EXTERNAL: 0.77
HBA1C MFR BLD HPLC: 13.1 %
LDL-C, EXTERNAL: 105
MICROALBUMIN UR TEST STR-MCNC: 62.3 MG/DL

## 2022-03-31 ENCOUNTER — TELEPHONE (OUTPATIENT)
Dept: ENDOCRINOLOGY | Age: 62
End: 2022-03-31

## 2022-03-31 NOTE — TELEPHONE ENCOUNTER
3/31/2022  11:14 AM    Patient called to get results from blood work and to see if dr. Silvia Nolan needs to update medication.  Please call her back at [phone]

## 2022-04-01 ENCOUNTER — HOSPITAL ENCOUNTER (OUTPATIENT)
Dept: MAMMOGRAPHY | Age: 62
Discharge: HOME | End: 2022-04-01
Attending: NURSE PRACTITIONER
Payer: MEDICAID

## 2022-04-01 DIAGNOSIS — Z12.31 VISIT FOR SCREENING MAMMOGRAM: ICD-10-CM

## 2022-04-01 PROCEDURE — 77067 SCR MAMMO BI INCL CAD: CPT

## 2022-04-04 ENCOUNTER — TELEPHONE (OUTPATIENT)
Dept: ENDOCRINOLOGY | Age: 62
End: 2022-04-04

## 2022-12-07 LAB
CREATININE, EXTERNAL: 0.74
HBA1C MFR BLD HPLC: 12 %
LDL-C, EXTERNAL: 125
MICROALBUMIN UR TEST STR-MCNC: 49.4 MG/DL

## 2022-12-08 ENCOUNTER — TELEPHONE (OUTPATIENT)
Dept: ENDOCRINOLOGY | Age: 62
End: 2022-12-08

## 2022-12-08 NOTE — TELEPHONE ENCOUNTER
12/8/2022  2:28 PM    Pt called and stated she would like to speak with Dr. Wanda Wilson regarding her diabetes. Pt can be reached at [phone].     Thanks,   Miki Mendoza

## 2022-12-08 NOTE — TELEPHONE ENCOUNTER
Spoke to the pt and she stated that she called to schedule an appointment with Dr. Cristela Lewis. Pt was transferred to the  to make the appt.

## 2023-01-09 ENCOUNTER — VIRTUAL VISIT (OUTPATIENT)
Dept: ENDOCRINOLOGY | Age: 63
End: 2023-01-09
Payer: MEDICAID

## 2023-01-09 DIAGNOSIS — Z79.4 TYPE 2 DIABETES MELLITUS WITH OTHER SPECIFIED COMPLICATION, WITH LONG-TERM CURRENT USE OF INSULIN (HCC): Primary | ICD-10-CM

## 2023-01-09 DIAGNOSIS — E11.69 TYPE 2 DIABETES MELLITUS WITH OTHER SPECIFIED COMPLICATION, WITH LONG-TERM CURRENT USE OF INSULIN (HCC): Primary | ICD-10-CM

## 2023-01-09 DIAGNOSIS — E78.2 MIXED HYPERLIPIDEMIA: ICD-10-CM

## 2023-01-09 DIAGNOSIS — I10 ESSENTIAL HYPERTENSION: ICD-10-CM

## 2023-01-09 PROCEDURE — 99443 PR PHYS/QHP TELEPHONE EVALUATION 21-30 MIN: CPT | Performed by: INTERNAL MEDICINE

## 2023-01-09 RX ORDER — ATORVASTATIN CALCIUM 80 MG/1
TABLET, FILM COATED ORAL
COMMUNITY
Start: 2023-01-05

## 2023-01-09 RX ORDER — DULAGLUTIDE 3 MG/.5ML
3 INJECTION, SOLUTION SUBCUTANEOUS
Qty: 12 EACH | Refills: 3
Start: 2023-01-09

## 2023-01-09 RX ORDER — GLIPIZIDE 5 MG/1
5 TABLET ORAL 2 TIMES DAILY
Qty: 180 TABLET | Refills: 3 | Status: SHIPPED | OUTPATIENT
Start: 2023-01-09

## 2023-01-09 NOTE — PROGRESS NOTES
Chief Complaint   Patient presents with    Diabetes     Pcp and pharmacy verified. TELEPHONE ONLY  [phone] (M)             **THIS IS A VIRTUAL VISIT VIA A TELEPHONE ENCOUNTER. PATIENT AGREED TO HAVE THEIR CARE DELIVERED OVER THE PHONE IN PLACE OF THEIR REGULARLY SCHEDULED OFFICE VISIT**       Joan Gaitan, was evaluated through a synchronous (real-time) audio-video encounter. The patient (or guardian if applicable) is aware that this is a billable service, which includes applicable co-pays. This Virtual Visit was conducted with patient's (and/or legal guardian's) consent. The visit was conducted pursuant to the emergency declaration under the [facility], [address] [place] [address] authority and the [employer] and [employer] Act. Patient identification was verified, and a caregiver was present when appropriate. The patient was located in a state where the provider was licensed to provide care. History of Present Illness: Joan Gaitan is a 60 y.o. female here for follow up of diabetes. Her A1C in December 2022 was 12%. I have not seen pt since February 2022. She did not keep any follow up visits. For her DM pt is currently taking Lantus 68 units every morning, Trulicity 5.8MH every Sunday (her PCP just increased the dose to 3.0 in December) and Metformin 1000mg BID. She has not been testing her BG \"something happened to my machine, it stopped working last month. She reports that when she was taking her BGs fasting they were in the 120s range. She denies issues of hypoglycemia. A typical day is as follows:  Pt wakes around 8AM.  - She has breakfast around 830AM, this AM she had ribs, mashed potatoes, corn and regular soda. - She denies having a mid-morning snack.  - She has lunch around 1PM, yesterday she had meatloaf, mashed potatoes and regular soda.     - She denies having a mid-afternoon snack  - She has dinner around 5-6PM, last night she had chicken, mashed potatoes and water. - She will occasionally have an evening snack. She is still drinking the 2-3 regular sodas per day. Exercise consists of house work. No history of vascular disease. She does have COPD. No history of neuropathy, or nephropathy. Last eye exam was \"awhile ago, I have to find a new eye doctor, because of my insurance. \"     Current Outpatient Medications   Medication Sig    atorvastatin (LIPITOR) 80 mg tablet     glipiZIDE (GLUCOTROL) 5 mg tablet Take 1 Tablet by mouth two (2) times a day. dulaglutide (Trulicity) 3 JX/9.0 mL pnij 3 mg by SubCUTAneous route every seven (7) days. metFORMIN (GLUCOPHAGE) 1,000 mg tablet Take 1,000 mg by mouth two (2) times daily (with meals). Lantus Solostar U-100 Insulin 100 unit/mL (3 mL) inpn 60 units every day (note dose increase) (Patient taking differently: 68 units every day In AM)    aspirin delayed-release 81 mg tablet TAKE 1 TABLET BY MOUTH ONCE DAILY    cholecalciferol (VITAMIN D3) (2,000 UNITS /50 MCG) cap capsule TK 2 CS PO ONCE A DAY    fluticasone propionate (FLONASE) 50 mcg/actuation nasal spray 2 SPRAYS IN EACH NOSTRIL ONCE D    losartan (COZAAR) 50 mg tablet TAKE 1 TABLET BY MOUTH ONCE DAILY    montelukast (SINGULAIR) 10 mg tablet TK 1 T PO QHS    Spiriva with HandiHaler 18 mcg inhalation capsule 1 C BY INHALING THE CONTENTS OF THE C USING THE HANDIHALER DEVICE ONCE A DAY    risperiDONE (RISPERDAL) 1 mg tablet Take  by mouth every evening. benztropine (COGENTIN) 1 mg tablet Take  by mouth every evening. hydrochlorothiazide (HYDRODIURIL) 25 mg tablet Take 25 mg by mouth daily. No current facility-administered medications for this visit.      No Known Allergies  Review of Systems:  - Eyes: no blurry vision or double vision  - Cardiovascular: no chest pain  - Respiratory: no shortness of breath  - Musculoskeletal: no myalgias  - Neurological: no numbness/tingling in extremities    Physical Examination:  There were no vitals taken for this visit. None    Data Reviewed:   A1C 12%  Urine MA/Cr 90  [id number]    Assessment/Plan:   1) DM > Pt refuses to stop drinking her regular sodas despite repeated instructions because of the effects of the sodas on her sugars. Pt again instructed to stop the regular sodas. Pt instructed to limit her carbohydrates to 30-45 grams with meals and 15 grams or less with snacks (but to limit snacks). For now pt to continue the Lantus 68 units daily, Metformin 2597RE BID and Trulicity 1.6SY weekly. I will start pt on Glipizide 5mg BID. 2) HTN > Pt is on an ARB for renal protection. Will not make any changes at this time. Her Urine MA/Cr was 90 in December 2022. 3) HLD > Pt to continue the Atorvastatin 80mg daily. Her Lipid pane was above goal on 40mg daily and her PCP increased to 80mg daily. Pt voices understanding and agreement with the plan. RTC 3 months. Pt will have to speak with her daughter to schedule when her daughter can bring her in for a follow up visit. Pt to call to make the follow up appointment when she speaks with her daughter. I spent 21 minutes of total time during this virtual visit with a telephone encounter. All questions were answered and a copy of the instructions were sent to her via [employer]/a letter. Follow-up and Dispositions    Return in about 3 months (around 4/9/2023).        Copy sent to:    Lauren Venegas

## 2023-01-09 NOTE — LETTER
1/9/2023 12:22 PM    Patient:  Marjorie Ragland   YOB: 1960  Date of Visit: 1/9/2023      Dear Courtney Collins NP  [address] [phone]  Via Fax: [phone]: Thank you for referring Ms. Princess Morgan to me for evaluation/treatment. Below are the relevant portions of my assessment and plan of care. If you have questions, please do not hesitate to call me. I look forward to following Ms. Juliet Crow along with you. Chief Complaint   Patient presents with    Diabetes     Pcp and pharmacy verified. TELEPHONE ONLY  [phone] (M)             **THIS IS A VIRTUAL VISIT VIA A TELEPHONE ENCOUNTER. PATIENT AGREED TO HAVE THEIR CARE DELIVERED OVER THE PHONE IN PLACE OF THEIR REGULARLY SCHEDULED OFFICE VISIT**       Marjorie Ragland, was evaluated through a synchronous (real-time) audio-video encounter. The patient (or guardian if applicable) is aware that this is a billable service, which includes applicable co-pays. This Virtual Visit was conducted with patient's (and/or legal guardian's) consent. The visit was conducted pursuant to the emergency declaration under the [address] [place] [address], [address] [place] [address] authority and the [employer] and [employer] General Act. Patient identification was verified, and a caregiver was present when appropriate. The patient was located in a state where the provider was licensed to provide care. History of Present Illness: Marjorie Ragland is a 60 y.o. female here for follow up of diabetes. Her A1C in December 2022 was 12%. I have not seen pt since February 2022. She did not keep any follow up visits. For her DM pt is currently taking Lantus 68 units every morning, Trulicity 2.3MZ every Sunday (her PCP just increased the dose to 3.0 in December) and Metformin 1000mg BID.     She has not been testing her BG \"something happened to my machine, it stopped working last month.  She reports that when she was taking her BGs fasting they were in the 120s range. She denies issues of hypoglycemia. A typical day is as follows:  Pt wakes around 8AM.  - She has breakfast around 830AM, this AM she had ribs, mashed potatoes, corn and regular soda. - She denies having a mid-morning snack.  - She has lunch around 1PM, yesterday she had meatloaf, mashed potatoes and regular soda. - She denies having a mid-afternoon snack  - She has dinner around 5-6PM, last night she had chicken, mashed potatoes and water. - She will occasionally have an evening snack. She is still drinking the 2-3 regular sodas per day. Exercise consists of house work. No history of vascular disease. She does have COPD. No history of neuropathy, or nephropathy. Last eye exam was \"awhile ago, I have to find a new eye doctor, because of my insurance. \"     Current Outpatient Medications   Medication Sig    atorvastatin (LIPITOR) 80 mg tablet     glipiZIDE (GLUCOTROL) 5 mg tablet Take 1 Tablet by mouth two (2) times a day.  dulaglutide (Trulicity) 3 SM/3.0 mL pnij 3 mg by SubCUTAneous route every seven (7) days.  metFORMIN (GLUCOPHAGE) 1,000 mg tablet Take 1,000 mg by mouth two (2) times daily (with meals).     Lantus Solostar U-100 Insulin 100 unit/mL (3 mL) inpn 60 units every day (note dose increase) (Patient taking differently: 68 units every day In AM)    aspirin delayed-release 81 mg tablet TAKE 1 TABLET BY MOUTH ONCE DAILY    cholecalciferol (VITAMIN D3) (2,000 UNITS /50 MCG) cap capsule TK 2 CS PO ONCE A DAY    fluticasone propionate (FLONASE) 50 mcg/actuation nasal spray 2 SPRAYS IN EACH NOSTRIL ONCE D    losartan (COZAAR) 50 mg tablet TAKE 1 TABLET BY MOUTH ONCE DAILY    montelukast (SINGULAIR) 10 mg tablet TK 1 T PO QHS    Spiriva with HandiHaler 18 mcg inhalation capsule 1 C BY INHALING THE CONTENTS OF THE C USING THE HANDIHALER DEVICE ONCE A DAY    risperiDONE (RISPERDAL) 1 mg tablet Take  by mouth every evening.  benztropine (COGENTIN) 1 mg tablet Take  by mouth every evening.  hydrochlorothiazide (HYDRODIURIL) 25 mg tablet Take 25 mg by mouth daily. No current facility-administered medications for this visit. No Known Allergies  Review of Systems:  - Eyes: no blurry vision or double vision  - Cardiovascular: no chest pain  - Respiratory: no shortness of breath  - Musculoskeletal: no myalgias  - Neurological: no numbness/tingling in extremities    Physical Examination:   There were no vitals taken for this visit. None    Data Reviewed:   A1C 12%  Urine MA/Cr 90  [id number]    Assessment/Plan:   1) DM > Pt refuses to stop drinking her regular sodas despite repeated instructions because of the effects of the sodas on her sugars. Pt again instructed to stop the regular sodas. Pt instructed to limit her carbohydrates to 30-45 grams with meals and 15 grams or less with snacks (but to limit snacks). For now pt to continue the Lantus 68 units daily, Metformin 6242CJ BID and Trulicity 2.6CX weekly. I will start pt on Glipizide 5mg BID. 2) HTN > Pt is on an ARB for renal protection. Will not make any changes at this time. Her Urine MA/Cr was 90 in December 2022. 3) HLD > Pt to continue the Atorvastatin 80mg daily. Her Lipid pane was above goal on 40mg daily and her PCP increased to 80mg daily. Pt voices understanding and agreement with the plan. RTC 3 months. Pt will have to speak with her daughter to schedule when her daughter can bring her in for a follow up visit. Pt to call to make the follow up appointment when she speaks with her daughter. I spent 21 minutes of total time during this virtual visit with a telephone encounter. All questions were answered and a copy of the instructions were sent to her via [employer]/a letter.        Copy sent to:    Daniella Wall           Sincerely,      Laura Harding MD

## 2023-03-13 ENCOUNTER — TRANSCRIBE ORDER (OUTPATIENT)
Dept: SCHEDULING | Age: 63
End: 2023-03-13

## 2023-03-13 DIAGNOSIS — Z12.31 VISIT FOR SCREENING MAMMOGRAM: Primary | ICD-10-CM

## 2023-04-23 DIAGNOSIS — Z12.31 VISIT FOR SCREENING MAMMOGRAM: Primary | ICD-10-CM

## 2023-07-19 LAB
CREATININE, EXTERNAL: 0.79
HBA1C MFR BLD HPLC: 11.1 %
LDL CHOLESTEROL, EXTERNAL: 105

## 2023-07-25 ENCOUNTER — TELEPHONE (OUTPATIENT)
Age: 63
End: 2023-07-25

## 2024-03-21 ENCOUNTER — TRANSCRIBE ORDERS (OUTPATIENT)
Facility: HOSPITAL | Age: 64
End: 2024-03-21

## 2024-03-21 DIAGNOSIS — Z12.31 VISIT FOR SCREENING MAMMOGRAM: Primary | ICD-10-CM

## 2024-04-15 ENCOUNTER — HOSPITAL ENCOUNTER (OUTPATIENT)
Facility: HOSPITAL | Age: 64
Discharge: HOME | End: 2024-04-18
Payer: MEDICAID

## 2024-04-15 VITALS — BODY MASS INDEX: 28.28 KG/M2 | WEIGHT: 176 LBS | HEIGHT: 66 IN

## 2024-04-15 DIAGNOSIS — Z12.31 VISIT FOR SCREENING MAMMOGRAM: ICD-10-CM

## 2024-04-15 PROCEDURE — 77067 SCR MAMMO BI INCL CAD: CPT

## 2024-04-24 ENCOUNTER — HOSPITAL ENCOUNTER (OUTPATIENT)
Facility: HOSPITAL | Age: 64
Discharge: HOME | End: 2024-04-27
Payer: MEDICAID

## 2024-04-24 DIAGNOSIS — R92.8 ABNORMAL MAMMOGRAM: ICD-10-CM

## 2024-04-24 PROCEDURE — G0279 TOMOSYNTHESIS, MAMMO: HCPCS

## 2024-04-24 PROCEDURE — 76642 ULTRASOUND BREAST LIMITED: CPT

## 2025-03-24 ENCOUNTER — TRANSCRIBE ORDERS (OUTPATIENT)
Facility: HOSPITAL | Age: 65
End: 2025-03-24

## 2025-03-24 DIAGNOSIS — Z12.31 VISIT FOR SCREENING MAMMOGRAM: Primary | ICD-10-CM

## 2025-05-05 ENCOUNTER — HOSPITAL ENCOUNTER (OUTPATIENT)
Facility: HOSPITAL | Age: 65
Discharge: HOME | End: 2025-05-08
Payer: MEDICAID

## 2025-05-05 DIAGNOSIS — Z12.31 VISIT FOR SCREENING MAMMOGRAM: ICD-10-CM

## 2025-05-05 PROCEDURE — 77063 BREAST TOMOSYNTHESIS BI: CPT
